# Patient Record
Sex: MALE | Race: WHITE | NOT HISPANIC OR LATINO | Employment: FULL TIME
[De-identification: names, ages, dates, MRNs, and addresses within clinical notes are randomized per-mention and may not be internally consistent; named-entity substitution may affect disease eponyms.]

---

## 2023-08-24 ENCOUNTER — TRANSCRIBE ORDERS (OUTPATIENT)
Dept: OTHER | Age: 68
End: 2023-08-24

## 2023-08-24 DIAGNOSIS — I25.810 CORONARY ARTERY DISEASE INVOLVING CORONARY BYPASS GRAFT OF NATIVE HEART WITHOUT ANGINA PECTORIS: Primary | ICD-10-CM

## 2024-01-03 ENCOUNTER — HOSPITAL ENCOUNTER (EMERGENCY)
Facility: CLINIC | Age: 69
Discharge: HOME | End: 2024-01-03
Attending: EMERGENCY MEDICINE | Admitting: EMERGENCY MEDICINE
Payer: COMMERCIAL

## 2024-01-03 VITALS
HEIGHT: 68 IN | OXYGEN SATURATION: 99 % | TEMPERATURE: 98.4 F | BODY MASS INDEX: 25.76 KG/M2 | WEIGHT: 170 LBS | RESPIRATION RATE: 18 BRPM | HEART RATE: 68 BPM | SYSTOLIC BLOOD PRESSURE: 194 MMHG | DIASTOLIC BLOOD PRESSURE: 101 MMHG

## 2024-01-03 DIAGNOSIS — I10 HYPERTENSION, UNSPECIFIED TYPE: ICD-10-CM

## 2024-01-03 LAB
ALBUMIN SERPL BCG-MCNC: 4.7 G/DL (ref 3.5–5.2)
ALP SERPL-CCNC: 102 U/L (ref 40–150)
ALT SERPL W P-5'-P-CCNC: 12 U/L (ref 0–70)
ANION GAP SERPL CALCULATED.3IONS-SCNC: 13 MMOL/L (ref 7–15)
AST SERPL W P-5'-P-CCNC: 20 U/L (ref 0–45)
BASOPHILS # BLD AUTO: 0.1 10E3/UL (ref 0–0.2)
BASOPHILS NFR BLD AUTO: 1 %
BILIRUB SERPL-MCNC: 0.3 MG/DL
BUN SERPL-MCNC: 23.3 MG/DL (ref 8–23)
CALCIUM SERPL-MCNC: 9.1 MG/DL (ref 8.8–10.2)
CHLORIDE SERPL-SCNC: 103 MMOL/L (ref 98–107)
CREAT SERPL-MCNC: 1.18 MG/DL (ref 0.67–1.17)
DEPRECATED HCO3 PLAS-SCNC: 24 MMOL/L (ref 22–29)
EGFRCR SERPLBLD CKD-EPI 2021: 68 ML/MIN/1.73M2
EOSINOPHIL # BLD AUTO: 0.2 10E3/UL (ref 0–0.7)
EOSINOPHIL NFR BLD AUTO: 2 %
ERYTHROCYTE [DISTWIDTH] IN BLOOD BY AUTOMATED COUNT: 15.9 % (ref 10–15)
GLUCOSE SERPL-MCNC: 94 MG/DL (ref 70–99)
HCT VFR BLD AUTO: 41.8 % (ref 40–53)
HGB BLD-MCNC: 13.4 G/DL (ref 13.3–17.7)
HOLD SPECIMEN: NORMAL
HOLD SPECIMEN: NORMAL
IMM GRANULOCYTES # BLD: 0.1 10E3/UL
IMM GRANULOCYTES NFR BLD: 1 %
LYMPHOCYTES # BLD AUTO: 3.8 10E3/UL (ref 0.8–5.3)
LYMPHOCYTES NFR BLD AUTO: 39 %
MCH RBC QN AUTO: 27.2 PG (ref 26.5–33)
MCHC RBC AUTO-ENTMCNC: 32.1 G/DL (ref 31.5–36.5)
MCV RBC AUTO: 85 FL (ref 78–100)
MONOCYTES # BLD AUTO: 0.7 10E3/UL (ref 0–1.3)
MONOCYTES NFR BLD AUTO: 7 %
NEUTROPHILS # BLD AUTO: 5 10E3/UL (ref 1.6–8.3)
NEUTROPHILS NFR BLD AUTO: 50 %
NRBC # BLD AUTO: 0 10E3/UL
NRBC BLD AUTO-RTO: 0 /100
PLATELET # BLD AUTO: 383 10E3/UL (ref 150–450)
POTASSIUM SERPL-SCNC: 4 MMOL/L (ref 3.4–5.3)
PROT SERPL-MCNC: 8.1 G/DL (ref 6.4–8.3)
RBC # BLD AUTO: 4.93 10E6/UL (ref 4.4–5.9)
SODIUM SERPL-SCNC: 140 MMOL/L (ref 135–145)
TROPONIN T SERPL HS-MCNC: 15 NG/L
WBC # BLD AUTO: 9.9 10E3/UL (ref 4–11)

## 2024-01-03 PROCEDURE — 84484 ASSAY OF TROPONIN QUANT: CPT | Performed by: EMERGENCY MEDICINE

## 2024-01-03 PROCEDURE — 85025 COMPLETE CBC W/AUTO DIFF WBC: CPT | Performed by: EMERGENCY MEDICINE

## 2024-01-03 PROCEDURE — 93005 ELECTROCARDIOGRAM TRACING: CPT

## 2024-01-03 PROCEDURE — 80053 COMPREHEN METABOLIC PANEL: CPT | Performed by: EMERGENCY MEDICINE

## 2024-01-03 PROCEDURE — 36415 COLL VENOUS BLD VENIPUNCTURE: CPT | Performed by: EMERGENCY MEDICINE

## 2024-01-03 PROCEDURE — 250N000013 HC RX MED GY IP 250 OP 250 PS 637: Performed by: EMERGENCY MEDICINE

## 2024-01-03 PROCEDURE — 99285 EMERGENCY DEPT VISIT HI MDM: CPT

## 2024-01-03 RX ORDER — CARVEDILOL 12.5 MG/1
6.25 TABLET ORAL 2 TIMES DAILY WITH MEALS
COMMUNITY

## 2024-01-03 RX ORDER — ROSUVASTATIN CALCIUM 40 MG/1
40 TABLET, COATED ORAL DAILY
COMMUNITY

## 2024-01-03 RX ORDER — AMLODIPINE BESYLATE 5 MG/1
5 TABLET ORAL DAILY
COMMUNITY

## 2024-01-03 RX ORDER — ASPIRIN 81 MG/1
81 TABLET, CHEWABLE ORAL DAILY
COMMUNITY

## 2024-01-03 RX ORDER — AMLODIPINE BESYLATE 2.5 MG/1
2.5 TABLET ORAL ONCE
Status: COMPLETED | OUTPATIENT
Start: 2024-01-03 | End: 2024-01-03

## 2024-01-03 RX ADMIN — AMLODIPINE BESYLATE 2.5 MG: 2.5 TABLET ORAL at 20:19

## 2024-01-03 ASSESSMENT — ACTIVITIES OF DAILY LIVING (ADL): ADLS_ACUITY_SCORE: 35

## 2024-01-03 NOTE — ED TRIAGE NOTES
Pt. BIBA for HTN  Pt. Drove to the fire station to be evaluated  Pt. Has an extensive cardiac hx: triple bypass, AA repair, HTN  Ran out of his meds for the last week until yesterday  /90 for EMS, EKG WNL  Asymptomatic, Denies CP, SOB, HA, dizziness

## 2024-01-04 LAB
ATRIAL RATE - MUSE: 58 BPM
DIASTOLIC BLOOD PRESSURE - MUSE: NORMAL MMHG
INTERPRETATION ECG - MUSE: NORMAL
P AXIS - MUSE: 12 DEGREES
PR INTERVAL - MUSE: 188 MS
QRS DURATION - MUSE: 102 MS
QT - MUSE: 452 MS
QTC - MUSE: 443 MS
R AXIS - MUSE: 39 DEGREES
SYSTOLIC BLOOD PRESSURE - MUSE: NORMAL MMHG
T AXIS - MUSE: 9 DEGREES
VENTRICULAR RATE- MUSE: 58 BPM

## 2024-01-04 NOTE — ED PROVIDER NOTES
History     Chief Complaint:  Hypertension       HPI   Mane Kenney is a 68 year old male presenting to the ER for evaluation of hypertension.  History is obtained from patient as well as supplemented by chart review.  Patient reports his blood pressure has historically been quite variable.  Ever since undergoing repair of his abdominal aorta in October, he notes that he has not checked his blood pressure very regularly.  Beginning about 5 days ago, he awoke and decided to start resuming blood pressure checks.  He reports that his systolic blood pressure has on average been around 215-220/110.  He has not noted any associated symptoms with this including chest pain, shortness of breath, abdominal pain, headache, or vision changes.  He contacted the nurse care line earlier [facility], where he was recommended to have his blood pressure checked at the fire department.  While at the fire department, his blood pressure was 248, and he was encouraged to come to the ED for further assessment.  Here in the ED, he denies any symptoms.  He does acknowledge some increased salt intake over the holiday weekend.  He denies any lower extremity swelling.  On further detailed history, he does acknowledge an episode of numbness/tingling involving his left lower extremity below the level of the knee to the dorsum of the foot.  He notes this lasted for about 1 hour, and occurred at night.  He notes experiencing the exact same symptoms off and on over the past 2 years, and episodes occur every week to month.  He otherwise denies any unilateral numbness, weakness, or other neurologic symptoms.      Independent Historian:   None - Patient Only    Review of External Notes:   I reviewed patient's chart, and reviewed a telephone encounter from earlier today.  Patient had stopped checking his blood pressure for the previous 2 weeks, though subsequently resumed on 12/29 where blood pressures were noted to be elevated.  Patient was  "hospitalized in October, with discharge instructions to take amlodipine, 5 mg daily.    I reviewed hospital discharge summary from [facility] on 10/29/2023 where patient was admitted for endovascular stent placement for abdominal aortic aneurysm.  Discharge prescription was for amlodipine, 5 mg daily.    Medications:    amLODIPine (NORVASC) 5 MG tablet  aspirin (ASA) 81 MG chewable tablet  carvedilol (COREG) 12.5 MG tablet  rosuvastatin (CRESTOR) 40 MG tablet        Past Medical History:    Hyperlipidemia  Coronary artery disease  AAA  Hyperlipidemia    Past Surgical History:    CABG  Abdominal aortic stent placement    Physical Exam   Patient Vitals for the past 24 hrs:   BP Temp Temp src Pulse Resp SpO2 Height Weight   01/03/24 1920 -- -- -- 62 -- 99 % -- --   01/03/24 1915 (!) 188/97 -- -- -- -- -- -- --   01/03/24 1813 (!) 225/103 -- -- -- -- -- 1.727 m (5' 8\") 77.1 kg (170 lb)   01/03/24 1811 -- 98.4  F (36.9  C) Oral 59 18 100 % -- --        Physical Exam  General:   Well-nourished   Speaking in full sentences  Eyes:   Conjunctiva without injection or scleral icterus  ENT:   Moist mucous membranes   Nares patent   Pinnae normal  Neck:   Full ROM   No stiffness appreciated  Resp:   Lungs CTAB   No crackles, wheezing or audible rubs   Good air movement  CV:    Normal rate, regular rhythm   S1 and S2 present   No murmur, gallop or rub  GI:   BS present   Abdomen soft without distention   Non-tender to light and deep palpation   No guarding or rebound tenderness  Skin:   Warm, dry, well perfused   No rashes or open wounds on exposed skin  MSK:   Moves all extremities   No focal deformities or swelling  Neuro:   Alert   Answers questions appropriately   Moves all extremities equally   Gait stable  Psych:   Normal affect, normal mood      Emergency Department Course   ECG  ECG results from 01/03/24   EKG 12-lead, tracing only     Value    Systolic Blood Pressure     Diastolic Blood Pressure     Ventricular Rate " 58    Atrial Rate 58    MI Interval 188    QRS Duration 102        QTc 443    P Axis 12    R AXIS 39    T Axis 9    Interpretation ECG      Sinus bradycardia with occasional Premature ventricular complexes  Inferior infarct , age undetermined  Abnormal ECG  No previous ECGs available         Laboratory:  Labs Ordered and Resulted from Time of ED Arrival to Time of ED Departure   COMPREHENSIVE METABOLIC PANEL - Abnormal       Result Value    Sodium 140      Potassium 4.0      Carbon Dioxide (CO2) 24      Anion Gap 13      Urea Nitrogen 23.3 (*)     Creatinine 1.18 (*)     GFR Estimate 68      Calcium 9.1      Chloride 103      Glucose 94      Alkaline Phosphatase 102      AST 20      ALT 12      Protein Total 8.1      Albumin 4.7      Bilirubin Total 0.3     CBC WITH PLATELETS AND DIFFERENTIAL - Abnormal    WBC Count 9.9      RBC Count 4.93      Hemoglobin 13.4      Hematocrit 41.8      MCV 85      MCH 27.2      MCHC 32.1      RDW 15.9 (*)     Platelet Count 383      % Neutrophils 50      % Lymphocytes 39      % Monocytes 7      % Eosinophils 2      % Basophils 1      % Immature Granulocytes 1      NRBCs per 100 WBC 0      Absolute Neutrophils 5.0      Absolute Lymphocytes 3.8      Absolute Monocytes 0.7      Absolute Eosinophils 0.2      Absolute Basophils 0.1      Absolute Immature Granulocytes 0.1      Absolute NRBCs 0.0     TROPONIN T, HIGH SENSITIVITY - Normal    Troponin T, High Sensitivity 15          Procedures   None    Emergency Department Course & Assessments:    Interventions:  Medications   amLODIPine (NORVASC) tablet 2.5 mg (has no administration in time range)        Assessments:  Seen and disposition determined    Independent Interpretation (X-rays, CTs, rhythm strip):  None    Consultations/Discussion of Management or Tests:  None        Social Determinants of Health affecting care:   None    Disposition:  The patient was discharged to home.     Impression & Plan      Medical Decision  Making:  Mane Pan is a very pleasant 68-year-old male with a complex past medical history significant for hypertension, CAD, and infrarenal abdominal aortic aneurysm, who presents to the ED for evaluation of hypertension.  VS on presentation reveal blood pressure of 225/103, which improved to 180/97 on recheck.  Here in the ER, patient is feeling well, and denies any specific complaints.  Further workup pursued to evaluate for evidence of endorgan damage.  He denies symptoms of chest pain, chest pressure, shortness of breath, and EKG demonstrates sinus rhythm without findings of acute ischemia.  Note is made of Q waves in the inferior leads, which has been noted on multiple previous EKGs from outside facility.  His high-sensitivity troponin has returned within normal limits, which I feel further argues against ACS.  He denies any vision loss, headache.  His renal function is baseline.  We discussed in detail any associated neurologic symptoms, for which he acknowledges experiencing a transient episode of left-sided tingling, discomfort and numbness to his left lower extremity 2 nights previous.  On further questioning, he notes experiencing these exact same symptoms to the exact same location off-and-on for the past 2 years.  Certainly, I considered TIA/CVA, and suggested/offered MRI/MRA for further evaluation.  Patient at this time is electing to forego further advanced imaging and wishes to discuss further with his PCP.  We discussed stroke symptoms that should prompt immediate return to the ER which patient verbalized understanding of.  Overall, given his history of recurrent symptoms to the exact same territory/distribution, my overall suspicion for TIA/CVA is relatively low however.  Patient was provided 1 dose of oral amlodipine here in the ER.  Per chart review, upon discharge from his hospital stay in October, he was instructed to begin amlodipine, 5 mg daily, though has only been taking 2.5 mg.  I  suspect his most recent elevations are secondary to increase salt intake as well as underdosing of his antihypertensives.  For a starting point, he will begin 5 mg daily.  He will contact his cardiology team tomorrow to discuss his symptomatology further, as well as PCP to review his lower extremity symptoms.  He is encouraged to return to the ER immediately with any other new or troubling symptoms.  Patient feels very comfortable with outlined plan of care and questions have been answered prior to discharge.      Diagnosis:    ICD-10-CM    1. Hypertension, unspecified type  I10          1/3/2024   Alan Pendleton MD Roach, Brian Donald, MD  01/03/24 2016

## 2024-01-04 NOTE — DISCHARGE INSTRUCTIONS
Please follow-up with your primary care team and cardiology tomorrow to discuss her elevated blood pressure readings and symptoms.    To start, I would recommend increasing her amlodipine from 2.5 mg daily to 5 mg daily.    Return immediately to the ER if you develop any new or troubling symptoms such as numbness, weakness, vision changes, facial droop, as these are all signs that could suggest a stroke.  This will be very important to be seen right away.    Discharge Instructions  Hypertension - High Blood Pressure    During you visit to the Emergency Department, your blood pressure was higher than the recommended blood pressure.  This may be related to stress, pain, medication or other temporary conditions. In these cases, your blood pressure may return to normal on its own. If you have a history of high blood pressure, you may need to have your provider adjust your medications. Sometimes, your high measurement here may indicate that you have developed high blood pressure that will stay high unless it is treated. As a general rule, high blood pressure causes problems over years rather than days, weeks, or months. So, while it is important to treat blood pressure, it is rarely important to treat blood pressure immediately. Occasionally we will begin a medication in the Emergency Department; more often we will recommend close follow-up for medications with a primary doctor/clinic.    Generally, every Emergency Department visit should have a follow-up clinic visit with either a primary or a specialty clinic/provider. Please follow-up as instructed by your emergency provider today.    Return to the Emergency Department if you start to have:  A severe headache.  Chest pain.  Shortness of breath.  Weakness or numbness that affects one part of the body.  Confusion.  Vision changes.  Significant swelling of legs and/or eyes.  A reaction to any medication started in the Emergency Department.    What can I do to help  myself?  Avoid alcohol.  Take any blood pressure medicine that you are prescribed.  Get a good night s sleep.  Lower your salt intake.  Exercise.  Lose weight.  Manage stress.  See your doctor regularly    If blood pressure medication was started in the Emergency Department:  The medicine may not have an immediate effect. The body and brain determine what blood pressure you have. The medicine s job is to retrain the body s  thermostat  to a lower blood pressure.  You will need to follow up with your provider to see how this medicine is working for you.  If you were given a prescription for medicine here today, be sure to read all of the information (including the package insert) that comes with your prescription.  This will include important information about the medicine, its side effects, and any warnings that you need to know about.  The pharmacist who fills the prescription can provide more information and answer questions you may have about the medicine.  If you have questions or concerns that the pharmacist cannot address, please call or return to the Emergency Department.   Remember that you can always come back to the Emergency Department if you are not able to see your regular provider in the amount of time listed above, if you get any new symptoms, or if there is anything that worries you.

## 2024-03-21 ENCOUNTER — TRANSFERRED RECORDS (OUTPATIENT)
Dept: HEALTH INFORMATION MANAGEMENT | Facility: CLINIC | Age: 69
End: 2024-03-21
Payer: COMMERCIAL